# Patient Record
Sex: MALE | Race: WHITE | NOT HISPANIC OR LATINO | URBAN - METROPOLITAN AREA
[De-identification: names, ages, dates, MRNs, and addresses within clinical notes are randomized per-mention and may not be internally consistent; named-entity substitution may affect disease eponyms.]

---

## 2023-12-05 ENCOUNTER — OFFICE (OUTPATIENT)
Dept: URBAN - METROPOLITAN AREA CLINIC 84 | Facility: CLINIC | Age: 53
End: 2023-12-05

## 2023-12-05 VITALS
SYSTOLIC BLOOD PRESSURE: 130 MMHG | HEIGHT: 72 IN | WEIGHT: 186 LBS | HEART RATE: 130 BPM | OXYGEN SATURATION: 97 % | DIASTOLIC BLOOD PRESSURE: 90 MMHG

## 2023-12-05 DIAGNOSIS — R16.0 HEPATOMEGALY, NOT ELSEWHERE CLASSIFIED: ICD-10-CM

## 2023-12-05 DIAGNOSIS — F10.10 ALCOHOL ABUSE, UNCOMPLICATED: ICD-10-CM

## 2023-12-05 DIAGNOSIS — R19.7 DIARRHEA, UNSPECIFIED: ICD-10-CM

## 2023-12-05 DIAGNOSIS — R14.0 ABDOMINAL DISTENSION (GASEOUS): ICD-10-CM

## 2023-12-05 DIAGNOSIS — R10.84 GENERALIZED ABDOMINAL PAIN: ICD-10-CM

## 2023-12-05 DIAGNOSIS — F17.200 NICOTINE DEPENDENCE, UNSPECIFIED, UNCOMPLICATED: ICD-10-CM

## 2023-12-05 PROCEDURE — 99204 OFFICE O/P NEW MOD 45 MIN: CPT | Performed by: NURSE PRACTITIONER

## 2024-01-16 ENCOUNTER — TELEHEALTH PROVIDED OTHER THAN IN PATIENT'S HOME (OUTPATIENT)
Dept: URBAN - METROPOLITAN AREA CLINIC 72 | Facility: CLINIC | Age: 54
End: 2024-01-16

## 2024-01-16 VITALS — WEIGHT: 180 LBS | HEIGHT: 72 IN

## 2024-01-16 DIAGNOSIS — K70.30 ALCOHOLIC CIRRHOSIS OF LIVER WITHOUT ASCITES: ICD-10-CM

## 2024-01-16 DIAGNOSIS — R93.5 ABNORMAL FINDINGS ON DIAGNOSTIC IMAGING OF OTHER ABDOMINAL R: ICD-10-CM

## 2024-01-16 DIAGNOSIS — F17.200 NICOTINE DEPENDENCE, UNSPECIFIED, UNCOMPLICATED: ICD-10-CM

## 2024-01-16 DIAGNOSIS — E53.8 DEFICIENCY OF OTHER SPECIFIED B GROUP VITAMINS: ICD-10-CM

## 2024-01-16 DIAGNOSIS — R19.7 DIARRHEA, UNSPECIFIED: ICD-10-CM

## 2024-01-16 DIAGNOSIS — R18.8 OTHER ASCITES: ICD-10-CM

## 2024-01-16 DIAGNOSIS — Z80.0 FAMILY HISTORY OF MALIGNANT NEOPLASM OF DIGESTIVE ORGANS: ICD-10-CM

## 2024-01-16 DIAGNOSIS — E55.9 VITAMIN D DEFICIENCY, UNSPECIFIED: ICD-10-CM

## 2024-01-16 PROCEDURE — 99214 OFFICE O/P EST MOD 30 MIN: CPT | Mod: 95 | Performed by: NURSE PRACTITIONER

## 2024-01-16 RX ORDER — SODIUM SULFATE, POTASSIUM SULFATE, MAGNESIUM SULFATE 17.5; 3.13; 1.6 G/ML; G/ML; G/ML
SOLUTION, CONCENTRATE ORAL
Qty: 1 | Refills: 0 | Status: COMPLETED
Start: 2024-01-16 | End: 2024-02-05

## 2024-01-16 RX ORDER — FUROSEMIDE 40 MG/1
40 TABLET ORAL
Qty: 90 | Refills: 0 | Status: COMPLETED
End: 2024-03-06

## 2024-01-16 RX ORDER — SPIRONOLACTONE 100 MG/1
100 TABLET ORAL
Qty: 90 | Refills: 0 | Status: COMPLETED
End: 2024-03-06

## 2024-01-16 NOTE — SERVICENOTES
Telehealth Platform Used: [employer]
Location of patient: home
Location of provider: home
Other persons participating: wife Kasie

## 2024-01-16 NOTE — SERVICEHPINOTES
Essence 54-year-old initially seen by Dr. Harrison 8/2015 for diarrhea and was diagnosed with Clostridium difficile.. He was last seen , 12/5/2023brhesau has not seen a doctor since he was seen in 2015. He states his diarrhea didn't improve but symptoms returned about a year ago. He has bloating and diarrhea. He has abdominal pain in the suprapubic area and on the left side it occurs every few days. He has diarrhea 3-5 days a week and will have 6-7 bowel movements a day. He describes the BMs as liquid with mucus and very little solid stool. He denies rectal bleeding or weight loss. The stool is urgent. He has occasional episodes of fecal incontinence. Imodium helps.Zeyad barely eats according to his wife. He drinks alcohol daily, vodka and cranberry. Wife states he wakes up drinking and drinks until he goes to bed. he states he has a drink about every 4-5 hours. He has occasional nausea after eating. He has episodes of vomiting. His heartburn about 3 times a week.Zeyad does have a family history of colon cancer in his mom at age 51.brPlanbr- Tissue Transglutaminase IgA Ab (TTG)br- Total Serum IgAbr- CMP (Complete Metabolic Panel)br- CBC w/auto diffbr- Vitamin D, 25 Hydroxy (25-OH)br- Vitamin B12 and Folatebr- PT/INRbr- AFP (Alpha Feto Protein)br- TSH(41866)br- T4 Freebr- Stool Calprotectin (fecal)br- GI Panel, STOOLbr- CT Scan Abdomen/Pelvis with contrast with oral and IV contrastbr- stop alcohol.  Seek alochol rehabilation
br
br He returns today, 1/16/2024
brCT reveled extensive ascites. I started Lasix 20 and Aladactone 50mg. He cannot tell a difference.  Abdomen and ankles andlgs are still swollen.  He has good days and bad days.  Most day 6-7 loose , most phlegm BMs.  LLQ  pain.  No hematochezia or melena.  Nausea and dry heaves 1-2 times per week.  He has cut down on alcohol intake but would not quantify.  br
br
br Labs
br 12/2023-  WBC 10, Hgb 12, , plt 253, TB 3.3, , , ALT 37, B12 255, folate &lt 2, INR 1.3, AFP 2.2 TSH 2.6, Vit D &lt 8, TTG &lt 2, IgA 478, MELD 15 Child Crooks B, MDF 10.7
br
br Imaging
br1/8/2024 - CT Scan abd/pelvis contrasted- - moderate hepatomegaly, cirrhosis., no liver lesions, extensive ascites, mild to moderate varcies, horseshoe kidney, possible 1 or 2, punctunate  left lower nonobstructing calculi, spleen mildly enlarged, no gallbladder wall thickening, pericholeysctic fluid, mild disordered nonobstruictive gas pattern, mild nonsoecific urinary bladder wall thickening, severe lower lumbar spondylosis, L5, S1br12/29/2023 - Radiology - Ultrasound - hepatomegaly, 23.2 cm, heterogeneous, with surface nodularity, nonspecific gallbladder distention, small pericholecystic fluid is likely due to ascites, small free intraperitoneal fluid noted, spleen br visited="true"

## 2024-01-30 ENCOUNTER — AMBULATORY SURGICAL CENTER (OUTPATIENT)
Dept: URBAN - METROPOLITAN AREA SURGERY 19 | Facility: SURGERY | Age: 54
End: 2024-01-30

## 2024-01-30 DIAGNOSIS — K74.60 UNSPECIFIED CIRRHOSIS OF LIVER: ICD-10-CM

## 2024-01-30 LAB
RELEVANT H&P ENDOSCOPY: (no result)
RELEVANT H&P ENDOSCOPY: (no result)

## 2024-01-30 PROCEDURE — 43235 EGD DIAGNOSTIC BRUSH WASH: CPT | Performed by: SPECIALIST

## 2024-03-06 ENCOUNTER — OFFICE (OUTPATIENT)
Dept: URBAN - METROPOLITAN AREA CLINIC 84 | Facility: CLINIC | Age: 54
End: 2024-03-06

## 2024-03-06 VITALS
DIASTOLIC BLOOD PRESSURE: 90 MMHG | WEIGHT: 192 LBS | HEART RATE: 99 BPM | OXYGEN SATURATION: 99 % | HEIGHT: 70 IN | SYSTOLIC BLOOD PRESSURE: 114 MMHG

## 2024-03-06 DIAGNOSIS — D50.9 IRON DEFICIENCY ANEMIA, UNSPECIFIED: ICD-10-CM

## 2024-03-06 DIAGNOSIS — K70.30 ALCOHOLIC CIRRHOSIS OF LIVER WITHOUT ASCITES: ICD-10-CM

## 2024-03-06 DIAGNOSIS — R18.8 OTHER ASCITES: ICD-10-CM

## 2024-03-06 DIAGNOSIS — F17.200 NICOTINE DEPENDENCE, UNSPECIFIED, UNCOMPLICATED: ICD-10-CM

## 2024-03-06 DIAGNOSIS — E55.9 VITAMIN D DEFICIENCY, UNSPECIFIED: ICD-10-CM

## 2024-03-06 DIAGNOSIS — E53.8 DEFICIENCY OF OTHER SPECIFIED B GROUP VITAMINS: ICD-10-CM

## 2024-03-06 DIAGNOSIS — K26.9 DUODENAL ULCER, UNSPECIFIED AS ACUTE OR CHRONIC, WITHOUT HEM: ICD-10-CM

## 2024-03-06 PROCEDURE — 99214 OFFICE O/P EST MOD 30 MIN: CPT | Performed by: NURSE PRACTITIONER

## 2024-05-06 ENCOUNTER — OFFICE (OUTPATIENT)
Dept: URBAN - METROPOLITAN AREA CLINIC 84 | Facility: CLINIC | Age: 54
End: 2024-05-06

## 2024-05-06 VITALS
WEIGHT: 184 LBS | SYSTOLIC BLOOD PRESSURE: 110 MMHG | HEIGHT: 70 IN | OXYGEN SATURATION: 100 % | HEART RATE: 63 BPM | DIASTOLIC BLOOD PRESSURE: 80 MMHG

## 2024-05-06 DIAGNOSIS — E55.9 VITAMIN D DEFICIENCY, UNSPECIFIED: ICD-10-CM

## 2024-05-06 DIAGNOSIS — K70.30 ALCOHOLIC CIRRHOSIS OF LIVER WITHOUT ASCITES: ICD-10-CM

## 2024-05-06 DIAGNOSIS — E53.8 DEFICIENCY OF OTHER SPECIFIED B GROUP VITAMINS: ICD-10-CM

## 2024-05-06 DIAGNOSIS — K59.00 CONSTIPATION, UNSPECIFIED: ICD-10-CM

## 2024-05-06 DIAGNOSIS — K26.9 DUODENAL ULCER, UNSPECIFIED AS ACUTE OR CHRONIC, WITHOUT HEM: ICD-10-CM

## 2024-05-06 DIAGNOSIS — R18.8 OTHER ASCITES: ICD-10-CM

## 2024-05-06 PROCEDURE — 99214 OFFICE O/P EST MOD 30 MIN: CPT | Performed by: NURSE PRACTITIONER

## 2024-05-06 RX ORDER — LACTULOSE 10 G/15ML
600 SOLUTION ORAL; RECTAL
Qty: 1800 | Refills: 10 | Status: ACTIVE
Start: 2024-05-06

## 2025-01-06 ENCOUNTER — OFFICE (OUTPATIENT)
Dept: URBAN - METROPOLITAN AREA CLINIC 84 | Facility: CLINIC | Age: 55
End: 2025-01-06
Payer: COMMERCIAL

## 2025-01-06 VITALS
WEIGHT: 168 LBS | DIASTOLIC BLOOD PRESSURE: 76 MMHG | HEART RATE: 108 BPM | OXYGEN SATURATION: 94 % | SYSTOLIC BLOOD PRESSURE: 110 MMHG | HEIGHT: 70 IN

## 2025-01-06 DIAGNOSIS — R16.0 HEPATOMEGALY, NOT ELSEWHERE CLASSIFIED: ICD-10-CM

## 2025-01-06 DIAGNOSIS — F17.200 NICOTINE DEPENDENCE, UNSPECIFIED, UNCOMPLICATED: ICD-10-CM

## 2025-01-06 DIAGNOSIS — E80.6 OTHER DISORDERS OF BILIRUBIN METABOLISM: ICD-10-CM

## 2025-01-06 DIAGNOSIS — R18.8 OTHER ASCITES: ICD-10-CM

## 2025-01-06 DIAGNOSIS — E53.8 DEFICIENCY OF OTHER SPECIFIED B GROUP VITAMINS: ICD-10-CM

## 2025-01-06 DIAGNOSIS — K70.30 ALCOHOLIC CIRRHOSIS OF LIVER WITHOUT ASCITES: ICD-10-CM

## 2025-01-06 DIAGNOSIS — Z80.0 FAMILY HISTORY OF MALIGNANT NEOPLASM OF DIGESTIVE ORGANS: ICD-10-CM

## 2025-01-06 PROCEDURE — 99214 OFFICE O/P EST MOD 30 MIN: CPT | Performed by: NURSE PRACTITIONER

## 2025-01-06 RX ORDER — SODIUM SULFATE, MAGNESIUM SULFATE, AND POTASSIUM CHLORIDE 17.75; 2.7; 2.25 G/1; G/1; G/1
TABLET ORAL
Qty: 1 | Refills: 0 | Status: ACTIVE
Start: 2025-01-06

## 2025-01-06 RX ORDER — FOLIC ACID 1 MG/1
1 TABLET ORAL
Qty: 90 | Refills: 3 | Status: ACTIVE
Start: 2023-12-08